# Patient Record
Sex: MALE | Race: WHITE | NOT HISPANIC OR LATINO | URBAN - METROPOLITAN AREA
[De-identification: names, ages, dates, MRNs, and addresses within clinical notes are randomized per-mention and may not be internally consistent; named-entity substitution may affect disease eponyms.]

---

## 2020-05-27 PROBLEM — 254915003: Status: ACTIVE | Noted: 2020-05-27

## 2020-06-18 ENCOUNTER — TELEPHONE ENCOUNTER (OUTPATIENT)
Dept: URBAN - METROPOLITAN AREA CLINIC 35 | Facility: CLINIC | Age: 70
End: 2020-06-18

## 2020-06-19 ENCOUNTER — OFFICE VISIT (OUTPATIENT)
Dept: URBAN - METROPOLITAN AREA CLINIC 37 | Facility: CLINIC | Age: 70
End: 2020-06-19

## 2020-06-19 ENCOUNTER — LAB OUTSIDE AN ENCOUNTER (OUTPATIENT)
Dept: URBAN - METROPOLITAN AREA CLINIC 37 | Facility: CLINIC | Age: 70
End: 2020-06-19

## 2020-06-19 VITALS — WEIGHT: 182 LBS | HEIGHT: 73 IN | BODY MASS INDEX: 24.12 KG/M2

## 2020-06-19 RX ORDER — TAMSULOSIN HYDROCHLORIDE 0.4 MG/1
1 CAPSULE CAPSULE ORAL ONCE A DAY
Qty: 30 | Status: ACTIVE | COMMUNITY

## 2020-06-19 RX ORDER — MULTIVITAMIN
1 TABLET TABLET ORAL ONCE A DAY
Qty: 30 | Status: ACTIVE | COMMUNITY

## 2020-06-19 RX ORDER — ONDANSETRON 8 MG/1
1 TABLET ON THE TONGUE AND ALLOW TO DISSOLVE  AS NEEDED TABLET, ORALLY DISINTEGRATING ORAL PRN
Status: ON HOLD | COMMUNITY

## 2020-06-19 RX ORDER — TEMAZEPAM 15 MG/1
1 CAPSULE AT BEDTIME AS NEEDED CAPSULE ORAL PRN
Status: ACTIVE | COMMUNITY

## 2020-06-19 RX ORDER — PANTOPRAZOLE SODIUM 40 MG/1
1 TABLET TABLET, DELAYED RELEASE ORAL ONCE A DAY
Qty: 30 | Status: ON HOLD | COMMUNITY

## 2020-06-19 RX ORDER — SODIUM CHLORIDE FOR INHALATION 0.9 %
AS DIRECTED VIAL, NEBULIZER (ML) INHALATION
Status: ON HOLD | COMMUNITY

## 2020-06-19 RX ORDER — GABAPENTIN 100 MG/1
2 CAPSULES CAPSULE ORAL ONCE A DAY
Status: ON HOLD | COMMUNITY

## 2020-06-19 RX ORDER — AMLODIPINE BESYLATE 5 MG/1
1 TABLET TABLET ORAL ONCE A DAY
Qty: 30 | Status: ACTIVE | COMMUNITY

## 2020-06-19 RX ORDER — LIDOCAINE 140 MG/1
1 PATCH REMOVE AFTER 12 HOURS PATCH CUTANEOUS ONCE A DAY
Status: ON HOLD | COMMUNITY

## 2020-06-19 NOTE — EXAM-MIGRATED EXAMINATIONS
GENERAL APPEARANCE: - alert, in no acute distress, well developed, well nourished;   NECK/THYROID: - neck supple, full range of motion, no thyromegaly, trachea midline;   SKIN: - no rash near around PEG site;   ABDOMEN: - Minimal pinkish granulation tissue at PEG tube site. Gauze with minimal old blood.  No infection.  No leakage;   NEUROLOGIC: - cranial nerves 2-12 grossly intact;   PSYCH: - cooperative with exam, mood/affect full range;

## 2020-06-19 NOTE — HPI-MIGRATED HPI
Interim investigations : Labs done on: -> 5/26/20 by Dr. Alvarez: - BUN 26 (H); Na 136; Cr 1.42 (H); Alb 4.0; Tbili 0.5; ALT 11; ALP 54; AST 13 5/19/20 by Dr. Alvarez: - CBC: WBC 10.1 (H); RBC 3.94 (L); Hgb 11.6 (L); Hct 35.9 (L); Plt 229 - Abs neut 7.29 (H); Abs mono 0.88 (H); Abs eosi 0.4 (H); Abs baso 0.08 (H);   Interim investigations : Imaging studies: ->  * CT scan Abd/pelvis on 5/13/20:  1. Interval PEG tube placement with balloon in gastric body. Interval resolution of small bowel dilation 2. S/p right nephrectomy and right hemicolectomy w/o evidence of local disease recurrence 3. Evidence of treatment response with decreased size of RUQ peritoneal based masses 4. No new site of abd/pelvic metastasis  * CT Chest w/ contrast on 5/13/20: Stable small pulmonary nodule. No new or enlarging pulmonary nodules  * CT Abd/Pelvis w/ IV contrast on 3/4/20: 1. Findings compatible with moderate to high grade SBO. Transition point is noted in RUQ of abdomen 2. As compared to exam on 2/2020, there has been interval progression of known metastatic disease predominantly localized to the peritoneal surface in the abdomen;   Follow up OV : Patient is here for a routine OV for -> peg tube bleeding This is a telehealth visit during the COVID-19 pandemic. Patient has consented for this visit.  Patient had EGD/PEG with me on 3/7/20 due to small bowel obstruction associated with metastatic right RCC, at whichmild esophagitis was found at GE junction. Gastritis and duodenitis were also noted. Second portion of duodenum was normal. S/p placement of an externally removable PEG tube for palliative decompression.  Since then had several months of chemotherapy with Dr. Alvarez and had good response with decrease in size of peritoneal mass and resolution of the SBO.  Patient has also been off since 6/13/20.  He has been taking adequate oral nutrition and his weight has been stable at 182 lbs.  He has gained ~30 lbs since the PEG tube was placed & TPN started.  Patient c/o mild discomfort and mild pain and minimal bleeding where his feeding tube was placed, especially with changing the gauze;   Follow up OV : Last OV was -> 3 weeks ago;

## 2020-06-25 ENCOUNTER — OFFICE VISIT (OUTPATIENT)
Dept: URBAN - METROPOLITAN AREA CLINIC 35 | Facility: CLINIC | Age: 70
End: 2020-06-25

## 2020-07-01 ENCOUNTER — OFFICE VISIT (OUTPATIENT)
Dept: URBAN - METROPOLITAN AREA SURGERY CENTER 8 | Facility: SURGERY CENTER | Age: 70
End: 2020-07-01

## 2020-07-12 PROBLEM — 161689000: Status: ACTIVE | Noted: 2020-05-27

## 2020-07-16 ENCOUNTER — TELEPHONE ENCOUNTER (OUTPATIENT)
Dept: URBAN - METROPOLITAN AREA CLINIC 35 | Facility: CLINIC | Age: 70
End: 2020-07-16

## 2020-07-16 ENCOUNTER — OFFICE VISIT (OUTPATIENT)
Dept: URBAN - METROPOLITAN AREA CLINIC 37 | Facility: CLINIC | Age: 70
End: 2020-07-16

## 2020-07-16 VITALS — HEIGHT: 73 IN | WEIGHT: 181 LBS | BODY MASS INDEX: 23.99 KG/M2

## 2020-07-16 RX ORDER — SODIUM CHLORIDE FOR INHALATION 0.9 %
AS DIRECTED VIAL, NEBULIZER (ML) INHALATION
Status: ON HOLD | COMMUNITY

## 2020-07-16 RX ORDER — GABAPENTIN 100 MG/1
2 CAPSULES CAPSULE ORAL ONCE A DAY
Status: ON HOLD | COMMUNITY

## 2020-07-16 RX ORDER — MULTIVITAMIN
1 TABLET TABLET ORAL ONCE A DAY
Qty: 30 | Status: ACTIVE | COMMUNITY

## 2020-07-16 RX ORDER — AMLODIPINE BESYLATE 5 MG/1
1 TABLET TABLET ORAL ONCE A DAY
Qty: 30 | Status: ACTIVE | COMMUNITY

## 2020-07-16 RX ORDER — LIDOCAINE 140 MG/1
1 PATCH REMOVE AFTER 12 HOURS PATCH CUTANEOUS ONCE A DAY
Status: ON HOLD | COMMUNITY

## 2020-07-16 RX ORDER — TEMAZEPAM 15 MG/1
1 CAPSULE AT BEDTIME AS NEEDED CAPSULE ORAL PRN
Status: ON HOLD | COMMUNITY

## 2020-07-16 RX ORDER — PEMBROLIZUMAB 25 MG/ML
AS DIRECTED INJECTION, SOLUTION INTRAVENOUS
Status: ACTIVE | COMMUNITY

## 2020-07-16 RX ORDER — PANTOPRAZOLE SODIUM 40 MG/1
1 TABLET TABLET, DELAYED RELEASE ORAL
Qty: 30 | Refills: 2
Start: 2020-07-16

## 2020-07-16 RX ORDER — ONDANSETRON 8 MG/1
1 TABLET ON THE TONGUE AND ALLOW TO DISSOLVE  AS NEEDED TABLET, ORALLY DISINTEGRATING ORAL PRN
Status: ON HOLD | COMMUNITY

## 2020-07-16 RX ORDER — PANTOPRAZOLE SODIUM 40 MG/1
1 TABLET TABLET, DELAYED RELEASE ORAL ONCE A DAY
Qty: 30 | Status: ON HOLD | COMMUNITY

## 2020-07-16 RX ORDER — TAMSULOSIN HYDROCHLORIDE 0.4 MG/1
1 CAPSULE CAPSULE ORAL ONCE A DAY
Qty: 30 | Status: ACTIVE | COMMUNITY

## 2020-07-16 RX ORDER — TAMSULOSIN HYDROCHLORIDE 0.4 MG/1
1 CAPSULE CAPSULE ORAL ONCE A DAY
Qty: 30 | Status: ON HOLD | COMMUNITY

## 2020-07-16 NOTE — EXAM-MIGRATED EXAMINATIONS
GENERAL APPEARANCE: - alert, in no acute distress, well developed, well nourished;   HEAD: - normocephalic, atraumatic;   EYES: - sclera anicteric bilaterally;   ABDOMEN: - old PEG site looks dry and clean, in redness;

## 2020-07-16 NOTE — HPI-MIGRATED HPI
Post-op OV : Patient -> denies any new changes in his/her health status since last OV Current Sx: no abd pain, no leakage from old PEG tube site Eats well;   Post-op OV : After EGD on -> 7/1/20 due to gastrostomy complication, the esophagus appeared normal with irregular GE junction. Distal esophageal bxx showed moderate chronic inflammation and reactive changes which may be seen in reflux esophagitis, but no IM, hyper eosinophilia or dysplasia. Non-bleeding mild gastritis was noted in the stomach with gastric body bxx showing minimal nonspecific chronic inflammation, but no H. Pylori, IM or atrophy. S/p removal of PEG tube by external traction. Duodenum was normal;   Post-op OV : Patient denies -> rectal bleeding, fever, nausea & vomiting since the procedure date;

## 2020-08-25 ENCOUNTER — OFFICE VISIT (OUTPATIENT)
Dept: URBAN - METROPOLITAN AREA CLINIC 35 | Facility: CLINIC | Age: 70
End: 2020-08-25

## 2020-11-03 ENCOUNTER — OFFICE VISIT (OUTPATIENT)
Dept: URBAN - METROPOLITAN AREA CLINIC 35 | Facility: CLINIC | Age: 70
End: 2020-11-03

## 2020-11-03 RX ORDER — PANTOPRAZOLE SODIUM 40 MG/1
1 TABLET TABLET, DELAYED RELEASE ORAL
Qty: 30 | Refills: 2

## 2020-11-03 NOTE — HPI-MIGRATED HPI
Colorectal cancer screening : This is a telehealth visit during the COVID-19 pandemic. Patient has consented for this visit;

## 2020-11-03 NOTE — HPI-MIGRATED HPI
Colorectal cancer screening : Patient is here for -> high risk surveillance colonoscopy due to personal history of colon cancer. S/p right hemicolectomy and partial omentectomy with a side-to-side anastomosis on 11/2019. Patient continues to follow up with Dr. Hoang Alvarez;   Colorectal cancer screening : Last colonoscopy was -> 10/12/2019, where they found a mass in the hepatic flexture and bxx confirming adenocarcinoma;   Colorectal cancer screening : Patients denies -> rectal bleeding, change in bowel habits, constipation, diarrhea, or abdominal pain;   Colorectal cancer screening : Current bowel movement patterns -> ?;   Colorectal cancer screening : Family history of GI malignancy: -> Denies;   Follow up OV : Patient is on -> Pantoprazole 40 mg daily ;   Follow up OV : Current symptoms are -> ???;   Follow up OV : Patient is here for a routine OV for -> GERD with esophagitis;   Follow up OV : Last OV was -> 3 months ago  Last EGD done 07/2020, due to gastrostomy complication. Reflux esophagitis was present in the distal esophagus. Also removed old PEG tube by external traction;     Colorectal cancer screening : Denies dysphagia or odynophagia Currently taking anticoagulants/NSAIDs: ?;

## 2020-12-16 PROBLEM — 473023007: Status: ACTIVE | Noted: 2020-06-19

## 2020-12-17 ENCOUNTER — OFFICE VISIT (OUTPATIENT)
Dept: URBAN - METROPOLITAN AREA CLINIC 37 | Facility: CLINIC | Age: 70
End: 2020-12-17

## 2020-12-22 ENCOUNTER — OFFICE VISIT (OUTPATIENT)
Dept: URBAN - METROPOLITAN AREA CLINIC 35 | Facility: CLINIC | Age: 70
End: 2020-12-22

## 2020-12-22 ENCOUNTER — LAB OUTSIDE AN ENCOUNTER (OUTPATIENT)
Dept: URBAN - METROPOLITAN AREA CLINIC 35 | Facility: CLINIC | Age: 70
End: 2020-12-22

## 2020-12-22 ENCOUNTER — TELEPHONE ENCOUNTER (OUTPATIENT)
Dept: URBAN - METROPOLITAN AREA CLINIC 35 | Facility: CLINIC | Age: 70
End: 2020-12-22

## 2020-12-22 VITALS — BODY MASS INDEX: 23.86 KG/M2 | WEIGHT: 180 LBS | HEIGHT: 73 IN

## 2020-12-22 RX ORDER — ONDANSETRON 8 MG/1
1 TABLET ON THE TONGUE AND ALLOW TO DISSOLVE  AS NEEDED TABLET, ORALLY DISINTEGRATING ORAL PRN
Status: ON HOLD | COMMUNITY

## 2020-12-22 RX ORDER — TAMSULOSIN HYDROCHLORIDE 0.4 MG/1
1 CAPSULE CAPSULE ORAL ONCE A DAY
Qty: 30 | Status: ACTIVE | COMMUNITY

## 2020-12-22 RX ORDER — SODIUM SULFATE, POTASSIUM SULFATE, MAGNESIUM SULFATE 17.5; 3.13; 1.6 G/ML; G/ML; G/ML
AS DIRECTED SOLUTION, CONCENTRATE ORAL ONCE
Qty: 1 KIT | Refills: 0 | OUTPATIENT
Start: 2020-12-22

## 2020-12-22 RX ORDER — TEMAZEPAM 15 MG/1
1 CAPSULE AT BEDTIME AS NEEDED CAPSULE ORAL PRN
Status: ON HOLD | COMMUNITY

## 2020-12-22 RX ORDER — PANTOPRAZOLE SODIUM 40 MG/1
1 TABLET TABLET, DELAYED RELEASE ORAL
Qty: 30 | Refills: 2 | OUTPATIENT

## 2020-12-22 RX ORDER — SODIUM CHLORIDE FOR INHALATION 0.9 %
AS DIRECTED VIAL, NEBULIZER (ML) INHALATION
Status: ON HOLD | COMMUNITY

## 2020-12-22 RX ORDER — GABAPENTIN 100 MG/1
2 CAPSULES CAPSULE ORAL ONCE A DAY
Status: ON HOLD | COMMUNITY

## 2020-12-22 RX ORDER — AMLODIPINE BESYLATE 5 MG/1
1 TABLET TABLET ORAL ONCE A DAY
Qty: 30 | Status: ACTIVE | COMMUNITY

## 2020-12-22 RX ORDER — MULTIVITAMIN
1 TABLET TABLET ORAL ONCE A DAY
Qty: 30 | Status: ACTIVE | COMMUNITY

## 2020-12-22 RX ORDER — PEMBROLIZUMAB 25 MG/ML
AS DIRECTED INJECTION, SOLUTION INTRAVENOUS
Status: ON HOLD | COMMUNITY

## 2020-12-22 RX ORDER — LIDOCAINE 140 MG/1
1 PATCH REMOVE AFTER 12 HOURS PATCH CUTANEOUS ONCE A DAY
Status: ON HOLD | COMMUNITY

## 2020-12-22 RX ORDER — PANTOPRAZOLE SODIUM 40 MG/1
1 TABLET TABLET, DELAYED RELEASE ORAL
Qty: 30 | Refills: 2 | Status: ACTIVE | COMMUNITY

## 2020-12-22 RX ORDER — TAMSULOSIN HYDROCHLORIDE 0.4 MG/1
1 CAPSULE CAPSULE ORAL ONCE A DAY
Qty: 30 | Status: ON HOLD | COMMUNITY

## 2020-12-22 NOTE — HPI-MIGRATED HPI
Follow up OV : Patient is here for a routine OV for -> GERD;   Follow up OV : Last OV was -> 5 months ago Last EGD done due to bleeding from gastrostomy. S/p removal of PEG tube by external traction and evidence of GERD;   Follow up OV : Patient is on -> Pantoprazole 40 mg daily ;   Follow up OV : Current symptoms are -> denies UGI symptoms;   Colorectal cancer screening : Family history of GI malignancy: -> Denies;   Colorectal cancer screening : Patient is here for -> high risk surveillance colonoscopy due to personal history of colon cancer. Patient follows up with Dr. Alvarez Was on Keytruda adjuvant therapy but stopped 3 months ago due to renal impairment Had surveillance CT scan today and is scheduled to follow up with Dr. Alvarez later today;   Colorectal cancer screening : Last colonoscopy was -> 10/2019.  S/p right hemicolectomy and partial omentectomy with a side-to-side anastomosis (Dr. Strong);   Colorectal cancer screening : Patients denies -> rectal bleeding, change in bowel habits, diarrhea, or abdominal pain admits mild constipation;   Colorectal cancer screening : Current bowel movement patterns -> 1 soft BM daily ;     Colorectal cancer screening : Denies dysphagia or odynophagia Currently taking anticoagulants/NSAIDs: Denies;

## 2021-01-06 ENCOUNTER — OFFICE VISIT (OUTPATIENT)
Dept: URBAN - METROPOLITAN AREA SURGERY CENTER 8 | Facility: SURGERY CENTER | Age: 71
End: 2021-01-06

## 2021-01-06 RX ORDER — GABAPENTIN 100 MG/1
2 CAPSULES CAPSULE ORAL ONCE A DAY
Status: ON HOLD | COMMUNITY

## 2021-01-06 RX ORDER — MULTIVITAMIN
1 TABLET TABLET ORAL ONCE A DAY
Qty: 30 | Status: ACTIVE | COMMUNITY

## 2021-01-06 RX ORDER — PANTOPRAZOLE SODIUM 40 MG/1
1 TABLET TABLET, DELAYED RELEASE ORAL
Qty: 30 | Refills: 2 | Status: ACTIVE | COMMUNITY

## 2021-01-06 RX ORDER — AMLODIPINE BESYLATE 5 MG/1
1 TABLET TABLET ORAL ONCE A DAY
Qty: 30 | Status: ACTIVE | COMMUNITY

## 2021-01-06 RX ORDER — LIDOCAINE 140 MG/1
1 PATCH REMOVE AFTER 12 HOURS PATCH CUTANEOUS ONCE A DAY
Status: ON HOLD | COMMUNITY

## 2021-01-06 RX ORDER — PEMBROLIZUMAB 25 MG/ML
AS DIRECTED INJECTION, SOLUTION INTRAVENOUS
Status: ON HOLD | COMMUNITY

## 2021-01-06 RX ORDER — SODIUM SULFATE, POTASSIUM SULFATE, MAGNESIUM SULFATE 17.5; 3.13; 1.6 G/ML; G/ML; G/ML
AS DIRECTED SOLUTION, CONCENTRATE ORAL ONCE
Qty: 1 KIT | Refills: 0 | Status: ACTIVE | COMMUNITY
Start: 2020-12-22

## 2021-01-06 RX ORDER — TAMSULOSIN HYDROCHLORIDE 0.4 MG/1
1 CAPSULE CAPSULE ORAL ONCE A DAY
Qty: 30 | Status: ACTIVE | COMMUNITY

## 2021-01-06 RX ORDER — ONDANSETRON 8 MG/1
1 TABLET ON THE TONGUE AND ALLOW TO DISSOLVE  AS NEEDED TABLET, ORALLY DISINTEGRATING ORAL PRN
Status: ON HOLD | COMMUNITY

## 2021-01-06 RX ORDER — TAMSULOSIN HYDROCHLORIDE 0.4 MG/1
1 CAPSULE CAPSULE ORAL ONCE A DAY
Qty: 30 | Status: ON HOLD | COMMUNITY

## 2021-01-06 RX ORDER — TEMAZEPAM 15 MG/1
1 CAPSULE AT BEDTIME AS NEEDED CAPSULE ORAL PRN
Status: ON HOLD | COMMUNITY

## 2021-01-06 RX ORDER — SODIUM CHLORIDE FOR INHALATION 0.9 %
AS DIRECTED VIAL, NEBULIZER (ML) INHALATION
Status: ON HOLD | COMMUNITY

## 2021-01-18 PROBLEM — 415081006: Status: ACTIVE | Noted: 2020-05-28

## 2021-01-21 ENCOUNTER — OFFICE VISIT (OUTPATIENT)
Dept: URBAN - METROPOLITAN AREA CLINIC 37 | Facility: CLINIC | Age: 71
End: 2021-01-21

## 2021-01-21 VITALS — WEIGHT: 180 LBS

## 2021-01-21 RX ORDER — TEMAZEPAM 15 MG/1
1 CAPSULE AT BEDTIME AS NEEDED CAPSULE ORAL PRN
Status: ON HOLD | COMMUNITY

## 2021-01-21 RX ORDER — PEMBROLIZUMAB 25 MG/ML
AS DIRECTED INJECTION, SOLUTION INTRAVENOUS
Status: ON HOLD | COMMUNITY

## 2021-01-21 RX ORDER — MULTIVITAMIN
1 TABLET TABLET ORAL ONCE A DAY
Qty: 30 | Status: ACTIVE | COMMUNITY

## 2021-01-21 RX ORDER — TAMSULOSIN HYDROCHLORIDE 0.4 MG/1
1 CAPSULE CAPSULE ORAL ONCE A DAY
Qty: 30 | Status: ON HOLD | COMMUNITY

## 2021-01-21 RX ORDER — PANTOPRAZOLE SODIUM 20 MG/1
1 TABLET TABLET, DELAYED RELEASE ORAL
Qty: 90 | Refills: 4 | OUTPATIENT

## 2021-01-21 RX ORDER — SODIUM CHLORIDE FOR INHALATION 0.9 %
AS DIRECTED VIAL, NEBULIZER (ML) INHALATION
Status: ON HOLD | COMMUNITY

## 2021-01-21 RX ORDER — ONDANSETRON 8 MG/1
1 TABLET ON THE TONGUE AND ALLOW TO DISSOLVE  AS NEEDED TABLET, ORALLY DISINTEGRATING ORAL PRN
Status: ON HOLD | COMMUNITY

## 2021-01-21 RX ORDER — TAMSULOSIN HYDROCHLORIDE 0.4 MG/1
1 CAPSULE CAPSULE ORAL ONCE A DAY
Qty: 30 | Status: ACTIVE | COMMUNITY

## 2021-01-21 RX ORDER — AMLODIPINE BESYLATE 5 MG/1
1 TABLET TABLET ORAL ONCE A DAY
Qty: 30 | Status: ACTIVE | COMMUNITY

## 2021-01-21 RX ORDER — GABAPENTIN 100 MG/1
2 CAPSULES CAPSULE ORAL ONCE A DAY
Status: ON HOLD | COMMUNITY

## 2021-01-21 RX ORDER — PANTOPRAZOLE SODIUM 40 MG/1
1 TABLET TABLET, DELAYED RELEASE ORAL
Qty: 30 | Refills: 2 | Status: ACTIVE | COMMUNITY

## 2021-01-21 RX ORDER — LIDOCAINE 140 MG/1
1 PATCH REMOVE AFTER 12 HOURS PATCH CUTANEOUS ONCE A DAY
Status: ON HOLD | COMMUNITY

## 2021-01-21 NOTE — HPI-MIGRATED HPI
Follow up OV : Patient is here for a routine OV for -> GERD;   Follow up OV : Last OV was -> 1 month ago Last EGD done due to bleeding from gastrostomy. S/p removal of PEG tube by external traction and evidence of GERD. Patient was previously on Pantoprazole 40 mg daily However, last OV decreased PPI to QOD due to history of CKD;   Follow up OV : Current symptoms are -> denies UGI symptoms since decreasing PPI;   Follow up OV : Patient is on -> ;   Post-op OV : After Colonoscopy on -> 01/06/2021, done for surveillance due to personal history of colon cancer. Eight small polyps were detected and resected. Histology showed they were tubular adenoma and hyperplastic polyps. Patent functional end-to-end ileo-colonic anastomosis, characterized by healthy appearing mucosa. Good quality bowel prep;   Post-op OV : Patient denies -> rectal bleeding, fever, nausea & vomiting since the procedure date;   Post-op OV : Patient -> denies any new changes in his/her health status since last OV;

## 2022-01-11 ENCOUNTER — OFFICE VISIT (OUTPATIENT)
Dept: URBAN - METROPOLITAN AREA CLINIC 35 | Facility: CLINIC | Age: 72
End: 2022-01-11
Payer: MEDICARE

## 2022-01-11 VITALS
SYSTOLIC BLOOD PRESSURE: 142 MMHG | BODY MASS INDEX: 26.37 KG/M2 | WEIGHT: 199 LBS | DIASTOLIC BLOOD PRESSURE: 78 MMHG | HEIGHT: 73 IN

## 2022-01-11 DIAGNOSIS — K21.00 CHRONIC REFLUX ESOPHAGITIS: ICD-10-CM

## 2022-01-11 DIAGNOSIS — Z85.038 PERSONAL HISTORY OF COLON CANCER: ICD-10-CM

## 2022-01-11 DIAGNOSIS — K29.60 OTHER GASTRITIS WITHOUT BLEEDING: ICD-10-CM

## 2022-01-11 DIAGNOSIS — N18.30 STAGE 3 CHRONIC KIDNEY DISEASE, UNSPECIFIED WHETHER STAGE 3A OR 3B CKD: ICD-10-CM

## 2022-01-11 PROCEDURE — 99213 OFFICE O/P EST LOW 20 MIN: CPT | Performed by: INTERNAL MEDICINE

## 2022-01-11 RX ORDER — SODIUM, POTASSIUM,MAG SULFATES 17.5-3.13G
177ML SOLUTION, RECONSTITUTED, ORAL ORAL
Qty: 354 MILLILITER | Refills: 0 | OUTPATIENT
Start: 2022-01-11 | End: 2022-01-13

## 2022-01-11 RX ORDER — PANTOPRAZOLE SODIUM 40 MG/1
1 TABLET TABLET, DELAYED RELEASE ORAL
Qty: 30 | Refills: 2 | Status: DISCONTINUED | COMMUNITY

## 2022-01-11 RX ORDER — GABAPENTIN 100 MG/1
2 CAPSULES CAPSULE ORAL ONCE A DAY
Status: ON HOLD | COMMUNITY

## 2022-01-11 RX ORDER — ONDANSETRON 8 MG/1
1 TABLET ON THE TONGUE AND ALLOW TO DISSOLVE  AS NEEDED TABLET, ORALLY DISINTEGRATING ORAL PRN
Status: ON HOLD | COMMUNITY

## 2022-01-11 RX ORDER — TAMSULOSIN HYDROCHLORIDE 0.4 MG/1
1 CAPSULE CAPSULE ORAL ONCE A DAY
Qty: 30 | Status: ON HOLD | COMMUNITY

## 2022-01-11 RX ORDER — AMLODIPINE BESYLATE 5 MG/1
1 TABLET TABLET ORAL ONCE A DAY
Qty: 30 | Status: ACTIVE | COMMUNITY

## 2022-01-11 RX ORDER — LIDOCAINE 140 MG/1
1 PATCH REMOVE AFTER 12 HOURS PATCH CUTANEOUS ONCE A DAY
Status: ON HOLD | COMMUNITY

## 2022-01-11 RX ORDER — TEMAZEPAM 15 MG/1
1 CAPSULE AT BEDTIME AS NEEDED CAPSULE ORAL PRN
Status: ON HOLD | COMMUNITY

## 2022-01-11 RX ORDER — MULTIVITAMIN
1 TABLET TABLET ORAL ONCE A DAY
Qty: 30 | Status: ACTIVE | COMMUNITY

## 2022-01-11 RX ORDER — SODIUM CHLORIDE FOR INHALATION 0.9 %
AS DIRECTED VIAL, NEBULIZER (ML) INHALATION
Status: ON HOLD | COMMUNITY

## 2022-01-11 RX ORDER — TAMSULOSIN HYDROCHLORIDE 0.4 MG/1
1 CAPSULE CAPSULE ORAL ONCE A DAY
Qty: 30 | Status: ACTIVE | COMMUNITY

## 2022-01-11 RX ORDER — PEMBROLIZUMAB 25 MG/ML
AS DIRECTED INJECTION, SOLUTION INTRAVENOUS
Status: ON HOLD | COMMUNITY

## 2022-01-11 NOTE — HPI-GERD
Patient is here to follow up on GERD Last OV was 1 year ago Patient was seen at [facility] on 03/2020 due to small bowel obstruction. Subsequently, had an EGD was done 03/07/2020, due to small bowel obstruction associated with metastatic right RCC. Non-bleeding mildly severe esophagitis was found at GE junction. Gastritis and duodenitis were also noted. Second portion of duodenum was normal. S/p placement if an externally removable of an externally removable PEG tube for palliative decompression.  Last EGD on 7/1/20 due to bleeding at PEG tube site, the esophagus appeared normal with irregular GE junction. Distal esophageal bxx showed moderate chronic inflammation and reactive changes which may be seen in reflux esophagitis, but no IM, hyper eosinophilia or dysplasia. Non-bleeding mild gastritis was noted in the stomach with gastric body bxx showing minimal nonspecific chronic inflammation, but no H. Pylori, IM or atrophy. S/p removal of PEG tube by external traction. Duodenum was normal Patient was previously taking Pantoprazole 40 mg daily.  On 12/2020, decreased Pantoprazole 40  mg QOD due to history of CKD.  Last OV advised to finish taking remaining prescription of Pantoprazole 40 mg and discontinue. Patient hadn't taken PPI since last OV. Current symptoms:  Patient denies pyrosis, dyspepsia, early satiety, abdomina pain ,or dysphagia/odynophagia since he discontinued PPI

## 2022-01-12 PROBLEM — 281255004: Status: ACTIVE | Noted: 2020-05-27

## 2022-01-12 PROBLEM — 301715003: Status: ACTIVE | Noted: 2020-05-28

## 2022-01-14 LAB
A/G RATIO: 1.8
ALBUMIN: 4.4
ALKALINE PHOSPHATASE: 63
ALT (SGPT): 10
AST (SGOT): 13
BILIRUBIN, TOTAL: 0.6
BUN/CREATININE RATIO: 10
BUN: 21
CALCIUM: 9.6
CARBON DIOXIDE, TOTAL: 22
CHLORIDE: 106
CREATININE: 2.01
EGFR AFRICAN AMERICAN: 37
EGFR NON-AFR. AMERICAN: 32
GLOBULIN, TOTAL: 2.5
GLUCOSE: 70
POTASSIUM: 4.3
PROTEIN, TOTAL: 6.9
SODIUM: 139

## 2022-01-26 ENCOUNTER — OFFICE VISIT (OUTPATIENT)
Dept: URBAN - METROPOLITAN AREA SURGERY CENTER 8 | Facility: SURGERY CENTER | Age: 72
End: 2022-01-26
Payer: MEDICARE

## 2022-01-26 ENCOUNTER — CLAIMS CREATED FROM THE CLAIM WINDOW (OUTPATIENT)
Dept: URBAN - METROPOLITAN AREA CLINIC 4 | Facility: CLINIC | Age: 72
End: 2022-01-26
Payer: MEDICARE

## 2022-01-26 DIAGNOSIS — K63.89 PNEUMATOSIS OF INTESTINES: ICD-10-CM

## 2022-01-26 DIAGNOSIS — Z86.010 ADENOMAS PERSONAL HISTORY OF COLONIC POLYPS: ICD-10-CM

## 2022-01-26 DIAGNOSIS — K63.89 BACTERIAL OVERGROWTH SYNDROME: ICD-10-CM

## 2022-01-26 PROCEDURE — 45380 COLONOSCOPY AND BIOPSY: CPT | Performed by: INTERNAL MEDICINE

## 2022-01-26 PROCEDURE — G8907 PT DOC NO EVENTS ON DISCHARG: HCPCS | Performed by: INTERNAL MEDICINE

## 2022-01-26 PROCEDURE — 88305 TISSUE EXAM BY PATHOLOGIST: CPT | Performed by: PATHOLOGY

## 2022-02-08 ENCOUNTER — OFFICE VISIT (OUTPATIENT)
Dept: URBAN - METROPOLITAN AREA CLINIC 35 | Facility: CLINIC | Age: 72
End: 2022-02-08
Payer: MEDICARE

## 2022-02-08 VITALS — BODY MASS INDEX: 26.37 KG/M2 | WEIGHT: 199 LBS | HEIGHT: 73 IN

## 2022-02-08 DIAGNOSIS — Z85.038 PERSONAL HISTORY OF COLON CANCER: ICD-10-CM

## 2022-02-08 DIAGNOSIS — K64.0 FIRST DEGREE HEMORRHOIDS: ICD-10-CM

## 2022-02-08 DIAGNOSIS — N18.30 STAGE 3 CHRONIC KIDNEY DISEASE, UNSPECIFIED WHETHER STAGE 3A OR 3B CKD: ICD-10-CM

## 2022-02-08 DIAGNOSIS — K29.60 OTHER GASTRITIS WITHOUT BLEEDING: ICD-10-CM

## 2022-02-08 DIAGNOSIS — K21.0 GASTROESOPHAGEAL REFLUX DISEASE WITH ESOPHAGITIS: ICD-10-CM

## 2022-02-08 DIAGNOSIS — K21.00 GERD WITH ESOPHAGITIS: ICD-10-CM

## 2022-02-08 DIAGNOSIS — K64.1 SECOND DEGREE HEMORRHOIDS: ICD-10-CM

## 2022-02-08 PROCEDURE — 99213 OFFICE O/P EST LOW 20 MIN: CPT | Performed by: INTERNAL MEDICINE

## 2022-02-08 RX ORDER — MULTIVITAMIN
1 TABLET TABLET ORAL ONCE A DAY
Qty: 30 | Status: ACTIVE | COMMUNITY

## 2022-02-08 RX ORDER — TAMSULOSIN HYDROCHLORIDE 0.4 MG/1
1 CAPSULE CAPSULE ORAL ONCE A DAY
Qty: 30 | Status: ACTIVE | COMMUNITY

## 2022-02-08 RX ORDER — LIDOCAINE 140 MG/1
1 PATCH REMOVE AFTER 12 HOURS PATCH CUTANEOUS ONCE A DAY
Status: ON HOLD | COMMUNITY

## 2022-02-08 RX ORDER — AMLODIPINE BESYLATE 5 MG/1
1 TABLET TABLET ORAL ONCE A DAY
Qty: 30 | Status: ACTIVE | COMMUNITY

## 2022-02-08 RX ORDER — TAMSULOSIN HYDROCHLORIDE 0.4 MG/1
1 CAPSULE CAPSULE ORAL ONCE A DAY
Qty: 30 | Status: ON HOLD | COMMUNITY

## 2022-02-08 RX ORDER — PEMBROLIZUMAB 25 MG/ML
AS DIRECTED INJECTION, SOLUTION INTRAVENOUS
Status: ON HOLD | COMMUNITY

## 2022-02-08 RX ORDER — TEMAZEPAM 15 MG/1
1 CAPSULE AT BEDTIME AS NEEDED CAPSULE ORAL PRN
Status: ON HOLD | COMMUNITY

## 2022-02-08 RX ORDER — ONDANSETRON 8 MG/1
1 TABLET ON THE TONGUE AND ALLOW TO DISSOLVE  AS NEEDED TABLET, ORALLY DISINTEGRATING ORAL PRN
Status: ON HOLD | COMMUNITY

## 2022-02-08 RX ORDER — GABAPENTIN 100 MG/1
2 CAPSULES CAPSULE ORAL ONCE A DAY
Status: ON HOLD | COMMUNITY

## 2022-02-08 RX ORDER — SODIUM CHLORIDE FOR INHALATION 0.9 %
AS DIRECTED VIAL, NEBULIZER (ML) INHALATION
Status: ON HOLD | COMMUNITY

## 2023-01-04 PROBLEM — 266433003: Status: ACTIVE | Noted: 2020-07-16

## 2023-01-04 PROBLEM — 4556007: Status: ACTIVE | Noted: 2020-07-12

## 2023-01-04 PROBLEM — 433144002: Status: ACTIVE | Noted: 2022-01-11

## 2023-01-05 ENCOUNTER — WEB ENCOUNTER (OUTPATIENT)
Dept: URBAN - METROPOLITAN AREA CLINIC 35 | Facility: CLINIC | Age: 73
End: 2023-01-05

## 2023-01-10 ENCOUNTER — LAB OUTSIDE AN ENCOUNTER (OUTPATIENT)
Dept: URBAN - METROPOLITAN AREA CLINIC 35 | Facility: CLINIC | Age: 73
End: 2023-01-10

## 2023-01-10 ENCOUNTER — OFFICE VISIT (OUTPATIENT)
Dept: URBAN - METROPOLITAN AREA CLINIC 35 | Facility: CLINIC | Age: 73
End: 2023-01-10
Payer: MEDICARE

## 2023-01-10 VITALS — HEIGHT: 73 IN | BODY MASS INDEX: 24.39 KG/M2 | WEIGHT: 184 LBS

## 2023-01-10 DIAGNOSIS — K21.9 ACID REFLUX: ICD-10-CM

## 2023-01-10 DIAGNOSIS — K29.60 OTHER GASTRITIS WITHOUT BLEEDING: ICD-10-CM

## 2023-01-10 DIAGNOSIS — K64.1 SECOND DEGREE HEMORRHOIDS: ICD-10-CM

## 2023-01-10 DIAGNOSIS — K64.0 FIRST DEGREE HEMORRHOIDS: ICD-10-CM

## 2023-01-10 DIAGNOSIS — N18.30 STAGE 3 CHRONIC KIDNEY DISEASE, UNSPECIFIED WHETHER STAGE 3A OR 3B CKD: ICD-10-CM

## 2023-01-10 PROCEDURE — 99213 OFFICE O/P EST LOW 20 MIN: CPT | Performed by: INTERNAL MEDICINE

## 2023-01-10 RX ORDER — SODIUM PICOSULFATE, MAGNESIUM OXIDE, AND ANHYDROUS CITRIC ACID 10; 3.5; 12 MG/160ML; G/160ML; G/160ML
160 ML LIQUID ORAL ONCE
Qty: 1 KIT | Refills: 0 | OUTPATIENT
Start: 2023-01-10 | End: 2023-01-12

## 2023-01-15 PROBLEM — 429699009: Status: ACTIVE | Noted: 2020-05-28

## 2023-02-10 ENCOUNTER — OFFICE VISIT (OUTPATIENT)
Dept: URBAN - METROPOLITAN AREA SURGERY CENTER 8 | Facility: SURGERY CENTER | Age: 73
End: 2023-02-10

## 2023-03-14 ENCOUNTER — OFFICE VISIT (OUTPATIENT)
Dept: URBAN - METROPOLITAN AREA CLINIC 35 | Facility: CLINIC | Age: 73
End: 2023-03-14

## 2023-03-15 ENCOUNTER — OFFICE VISIT (OUTPATIENT)
Dept: URBAN - METROPOLITAN AREA SURGERY CENTER 8 | Facility: SURGERY CENTER | Age: 73
End: 2023-03-15
Payer: MEDICARE

## 2023-03-15 ENCOUNTER — CLAIMS CREATED FROM THE CLAIM WINDOW (OUTPATIENT)
Dept: URBAN - METROPOLITAN AREA CLINIC 4 | Facility: CLINIC | Age: 73
End: 2023-03-15
Payer: MEDICARE

## 2023-03-15 DIAGNOSIS — K63.89 OTHER SPECIFIED DISEASES OF INTESTINE: ICD-10-CM

## 2023-03-15 DIAGNOSIS — Z98.0 H/O BILLROTH II OPERATION: ICD-10-CM

## 2023-03-15 DIAGNOSIS — D12.4 ADENOMA OF DESCENDING COLON: ICD-10-CM

## 2023-03-15 DIAGNOSIS — Z85.038 H/O COLON CANCER, STAGE I: ICD-10-CM

## 2023-03-15 DIAGNOSIS — K63.89 APPENDICITIS EPIPLOICA: ICD-10-CM

## 2023-03-15 PROCEDURE — 88305 TISSUE EXAM BY PATHOLOGIST: CPT | Performed by: PATHOLOGY

## 2023-03-15 PROCEDURE — G8907 PT DOC NO EVENTS ON DISCHARG: HCPCS | Performed by: INTERNAL MEDICINE

## 2023-03-15 PROCEDURE — 45380 COLONOSCOPY AND BIOPSY: CPT | Performed by: INTERNAL MEDICINE

## 2023-03-15 PROCEDURE — 45385 COLONOSCOPY W/LESION REMOVAL: CPT | Performed by: INTERNAL MEDICINE

## 2023-04-04 ENCOUNTER — OFFICE VISIT (OUTPATIENT)
Dept: URBAN - METROPOLITAN AREA CLINIC 35 | Facility: CLINIC | Age: 73
End: 2023-04-04

## 2023-04-04 ENCOUNTER — DASHBOARD ENCOUNTERS (OUTPATIENT)
Age: 73
End: 2023-04-04

## 2023-04-04 VITALS — HEIGHT: 73 IN | WEIGHT: 198 LBS | BODY MASS INDEX: 26.24 KG/M2
